# Patient Record
(demographics unavailable — no encounter records)

---

## 2024-11-25 NOTE — HISTORY OF PRESENT ILLNESS
[FreeTextEntry8] : 46 y/o man presents for concerns regarding male pattern baldness. Wants to discuss his options. Wants COVID and flu vaccines.

## 2024-11-25 NOTE — REVIEW OF SYSTEMS
[Hair Changes] : hair changes [Negative] : Heme/Lymph [Itching] : no itching [Skin Rash] : no skin rash

## 2024-11-25 NOTE — ASSESSMENT
[FreeTextEntry1] : DIscussed mutitple options including topical medications as well as Propecia vs Minoxidil. Will start Propecia. Aware of risks and benefits. Check labs first. COVID and flu vaccines given.

## 2025-03-05 NOTE — HISTORY OF PRESENT ILLNESS
[FreeTextEntry1] : 45M, pt of Dr. Kacie Degroot, originally seen for secondary pruritus ani due to an anal fissure. Last evaluated over the summer and had been healing well. Screening colonoscopy performed in November and was normal. Today presents for persistent/recurrent fissure symptomatology. Patient states he continues to use Compound medication twice a day but symptoms cycle. States symptoms return after harder BM. Has been trying to incorporate more fiber into diet currently not on stool softeners.   PMH: Anxiety Meds: Zoloft All: NKDA PSH: Parathyroidectomy FH: Sibling with colitis. Denies CRC Cscope: November 2024, WNL

## 2025-03-05 NOTE — PHYSICAL EXAM
[Abdomen Tenderness] : ~T No ~M abdominal tenderness [JVD] : no jugular venous distention  [Normal Breath Sounds] : Normal breath sounds [Alert] : alert [Calm] : calm [de-identified] : Well appearing male in NAD [de-identified] : MMM [de-identified] : ROM WNL [FreeTextEntry1] : The pt was examined in the prone honey-knife position with a medical assistant present for the entirety of the examination. Visual examination of the anal verge with effacement of the buttocks revealed a immaturely healed anterior and posterior anal fissure with small associated anterior sentinel pile. Otherwise no masses, ulcerations, or skin rashes. Digital rectal exam revealed no palpable mass or blood with sphincter tone within normal limits. A lubricated anoscope was then inserted revealing healthy appearing distal rectal mucosa and anoderm with three appropriately sized, noninflamed internal hemorrhoids.  The patient tolerated the exam well.

## 2025-03-05 NOTE — ASSESSMENT
[FreeTextEntry1] : 46M with intermittently symptomatic anal fissure, currently with superficial healing.

## 2025-03-05 NOTE — PLAN
[TextEntry] : - Trial of stool softeners to limit local trauma associated with BMs. - If able to limit re-opening for the next 3 months can expect more durable/pliable scar. - If unable to do so will revisit EUA with botox.

## 2025-05-13 NOTE — HISTORY OF PRESENT ILLNESS
[de-identified] : 47 y/o man presents for CPE. Has chronic LBP- in PT. ANal fissure still vexxing him. Wants to refill meds.  UTD with HCM.

## 2025-05-13 NOTE — HEALTH RISK ASSESSMENT
[Good] : ~his/her~  mood as  good [No falls in past year] : Patient reported no falls in the past year [0] : 2) Feeling down, depressed, or hopeless: Not at all (0) [PHQ-2 Negative - No further assessment needed] : PHQ-2 Negative - No further assessment needed [Patient reported colonoscopy was normal] : Patient reported colonoscopy was normal [HIV test declined] : HIV test declined [Hepatitis C test declined] : Hepatitis C test declined [[id number]] : 0 [None] : Patient does not have any barriers to medication adherence [Yes] : Reviewed medication list for presence of high-risk medications. [Never] : Never [Change in mental status noted] : No change in mental status noted [Language] : denies difficulty with language [Behavior] : denies difficulty with behavior [Learning/Retaining New Information] : denies difficulty learning/retaining new information [Handling Complex Tasks] : denies difficulty handling complex tasks [Reasoning] : denies difficulty with reasoning [Spatial Ability and Orientation] : denies difficulty with spatial ability and orientation [With Family] : lives with family [Employed] : employed [] :  [Sexually Active] : sexually active [Feels Safe at Home] : Feels safe at home [Fully functional (bathing, dressing, toileting, transferring, walking, feeding)] : Fully functional (bathing, dressing, toileting, transferring, walking, feeding) [Fully functional (using the telephone, shopping, preparing meals, housekeeping, doing laundry, using] : Fully functional and needs no help or supervision to perform IADLs (using the telephone, shopping, preparing meals, housekeeping, doing laundry, using transportation, managing medications and managing finances) [Reports changes in hearing] : Reports no changes in hearing [Reports changes in vision] : Reports no changes in vision [Seat Belt] :  uses seat belt [ColonoscopyDate] : 11/24

## 2025-05-13 NOTE — ASSESSMENT
[Vaccines Reviewed] : Immunizations reviewed today. Please see immunization details in the vaccine log within the immunization flowsheet.  [FreeTextEntry1] : 46 year is here for a CPE. He was counselled on diet and exercise, drug and alcohol use, age appropriate health care maintenance including vaccines, seatbelts, sunscreen, stress reduction and safe sex. All questions asked/answered to the best of my ability. Labs sent.